# Patient Record
Sex: FEMALE | Race: WHITE | URBAN - METROPOLITAN AREA
[De-identification: names, ages, dates, MRNs, and addresses within clinical notes are randomized per-mention and may not be internally consistent; named-entity substitution may affect disease eponyms.]

---

## 2018-09-24 ENCOUNTER — OPTICAL OFFICE (OUTPATIENT)
Dept: URBAN - METROPOLITAN AREA CLINIC 143 | Facility: CLINIC | Age: 48
Setting detail: OPHTHALMOLOGY
End: 2018-09-24
Payer: COMMERCIAL

## 2018-09-24 ENCOUNTER — DOCTOR'S OFFICE (OUTPATIENT)
Dept: URBAN - METROPOLITAN AREA CLINIC 136 | Facility: CLINIC | Age: 48
Setting detail: OPHTHALMOLOGY
End: 2018-09-24
Payer: COMMERCIAL

## 2018-09-24 DIAGNOSIS — H52.4: ICD-10-CM

## 2018-09-24 DIAGNOSIS — H52.03: ICD-10-CM

## 2018-09-24 PROCEDURE — V2100 LENS SPHER SINGLE PLANO 4.00: HCPCS | Performed by: OPTOMETRIST

## 2018-09-24 PROCEDURE — V2784 LENS POLYCARB OR EQUAL: HCPCS | Performed by: OPTOMETRIST

## 2018-09-24 PROCEDURE — V2020 VISION SVCS FRAMES PURCHASES: HCPCS | Performed by: OPTOMETRIST

## 2018-09-24 PROCEDURE — 92014 COMPRE OPH EXAM EST PT 1/>: CPT | Performed by: OPTOMETRIST

## 2018-09-24 PROCEDURE — V2025 EYEGLASSES DELUX FRAMES: HCPCS | Performed by: OPTOMETRIST

## 2018-09-24 ASSESSMENT — REFRACTION_MANIFEST
OD_VA3: 20/
OS_SPHERE: +0.75
OS_CYLINDER: SPH
OS_VA2: 20/
OD_VA1: 20/
OU_VA: 20/20
OD_SPHERE: +1.50
OD_VA2: 20/
OD_ADD: +1.00
OS_VA1: 20/
OD_CYLINDER: SPH
OD_VA3: 20/
OD_VA1: 20/20
OS_CYLINDER: SPH
OD_CYLINDER: SPH
OS_ADD: +1.00
OD_VA2: 20/20
OS_VA1: 20/20
OS_VA2: 20/20
OD_SPHERE: +0.50
OS_SPHERE: +1.50
OS_VA3: 20/
OU_VA: 20/
OS_VA3: 20/

## 2018-09-24 ASSESSMENT — KERATOMETRY
OD_K1POWER_DIOPTERS: 42.75
OS_K1POWER_DIOPTERS: 43.50
OD_K2POWER_DIOPTERS: 43.75
OS_K2POWER_DIOPTERS: 43.75
OD_AXISANGLE_DEGREES: 166
OS_AXISANGLE_DEGREES: 020

## 2018-09-24 ASSESSMENT — REFRACTION_AUTOREFRACTION
OS_AXIS: 048
OS_CYLINDER: -0.25
OS_SPHERE: +1.50
OD_CYLINDER: 0.00
OD_SPHERE: +0.75

## 2018-09-24 ASSESSMENT — VISUAL ACUITY
OS_BCVA: 20/20
OD_BCVA: 20/20

## 2018-09-24 ASSESSMENT — SPHEQUIV_DERIVED
OD_SPHEQUIV: 0.75
OS_SPHEQUIV: 1.375

## 2018-09-24 ASSESSMENT — REFRACTION_CURRENTRX
OS_OVR_VA: 20/
OD_OVR_VA: 20/
OS_OVR_VA: 20/
OS_OVR_VA: 20/
OD_OVR_VA: 20/
OD_OVR_VA: 20/

## 2018-09-24 ASSESSMENT — CONFRONTATIONAL VISUAL FIELD TEST (CVF)
OD_FINDINGS: FULL
OS_FINDINGS: FULL

## 2018-09-24 ASSESSMENT — AXIALLENGTH_DERIVED
OS_AL: 23.0243
OD_AL: 23.3932

## 2018-10-23 ENCOUNTER — DOCTOR'S OFFICE (OUTPATIENT)
Dept: URBAN - METROPOLITAN AREA CLINIC 136 | Facility: CLINIC | Age: 48
Setting detail: OPHTHALMOLOGY
End: 2018-10-23
Payer: COMMERCIAL

## 2018-10-23 DIAGNOSIS — Z79.891: ICD-10-CM
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
## 2018-10-23 PROBLEM — H52.4 HYPEROPIA, PRESBYOPIA OU ; BOTH EYES: Status: ACTIVE | Noted: 2018-09-24

## 2018-10-23 PROBLEM — H52.03 HYPEROPIA, PRESBYOPIA OU ; BOTH EYES: Status: ACTIVE | Noted: 2018-09-24

## 2018-10-23 PROCEDURE — NO CHARGE N/C PROFESSIONAL COURTESY: Performed by: OPHTHALMOLOGY

## 2018-10-24 PROBLEM — H35.363 DRUSEN; BOTH EYES: Status: ACTIVE | Noted: 2018-10-23

## 2018-10-24 ASSESSMENT — REFRACTION_CURRENTRX
OS_OVR_VA: 20/
OD_OVR_VA: 20/
OS_OVR_VA: 20/
OD_OVR_VA: 20/
OS_OVR_VA: 20/
OD_OVR_VA: 20/

## 2018-10-24 ASSESSMENT — REFRACTION_MANIFEST
OS_CYLINDER: SPH
OD_CYLINDER: SPH
OU_VA: 20/
OS_VA2: 20/
OD_VA3: 20/
OS_CYLINDER: SPH
OS_VA3: 20/
OD_VA1: 20/20
OD_CYLINDER: SPH
OS_VA1: 20/
OD_VA1: 20/
OD_SPHERE: +0.50
OS_SPHERE: +0.75
OS_SPHERE: +1.50
OS_VA3: 20/
OS_ADD: +1.00
OD_VA2: 20/
OS_VA2: 20/20
OD_ADD: +1.00
OU_VA: 20/20
OD_SPHERE: +1.50
OS_VA1: 20/20
OD_VA2: 20/20
OD_VA3: 20/

## 2018-10-24 ASSESSMENT — SPHEQUIV_DERIVED
OD_SPHEQUIV: 0.75
OS_SPHEQUIV: 1.375

## 2018-10-24 ASSESSMENT — REFRACTION_AUTOREFRACTION
OD_CYLINDER: 0.00
OS_SPHERE: +1.50
OD_SPHERE: +0.75
OS_CYLINDER: -0.25
OS_AXIS: 048

## 2018-10-24 ASSESSMENT — VISUAL ACUITY
OS_BCVA: 20/20
OD_BCVA: 20/20

## 2022-12-21 ENCOUNTER — OFFICE VISIT (OUTPATIENT)
Dept: PODIATRY | Facility: CLINIC | Age: 52
End: 2022-12-21

## 2022-12-21 ENCOUNTER — APPOINTMENT (OUTPATIENT)
Dept: RADIOLOGY | Facility: CLINIC | Age: 52
End: 2022-12-21

## 2022-12-21 VITALS — SYSTOLIC BLOOD PRESSURE: 134 MMHG | WEIGHT: 178.2 LBS | DIASTOLIC BLOOD PRESSURE: 82 MMHG

## 2022-12-21 DIAGNOSIS — M20.11 HALLUX VALGUS OF RIGHT FOOT: ICD-10-CM

## 2022-12-21 DIAGNOSIS — M79.671 RIGHT FOOT PAIN: ICD-10-CM

## 2022-12-21 DIAGNOSIS — M20.11 HALLUX VALGUS OF RIGHT FOOT: Primary | ICD-10-CM

## 2022-12-21 RX ORDER — HYDROXYCHLOROQUINE SULFATE 200 MG/1
200 TABLET, FILM COATED ORAL DAILY
COMMUNITY

## 2022-12-21 RX ORDER — B-COMPLEX WITH VITAMIN C
1 TABLET ORAL
COMMUNITY

## 2022-12-21 RX ORDER — PREDNISONE 1 MG/1
5 TABLET ORAL EVERY OTHER DAY
COMMUNITY

## 2022-12-21 RX ORDER — PRAVASTATIN SODIUM 40 MG
40 TABLET ORAL DAILY
COMMUNITY
Start: 2022-12-10

## 2022-12-21 RX ORDER — LISINOPRIL 20 MG/1
20 TABLET ORAL DAILY
COMMUNITY
Start: 2022-07-06 | End: 2023-07-06

## 2022-12-21 RX ORDER — LEVOTHYROXINE SODIUM 0.05 MG/1
50 TABLET ORAL DAILY
COMMUNITY
Start: 2022-07-06

## 2022-12-21 NOTE — PROGRESS NOTES
Assessment/Plan:    Explained to patient that she is dealing with a hallux valgus deformity of the right foot  Clinically it appears to be a high moderate deformity  Discussed etiology and treatment options in detail  Discussed possibility of orthotics to keep bunion from getting worse  Explained that orthotics may be of help in this manner  Patient told to wear shoes that are soft and wide  such as the running shoes that she appears in today  Explained that surgical intervention is necessary for correction  Patient was referred for x-rays to assess current severity  She will be contacted with results  Note: 30 minutes spent with patient  No problem-specific Assessment & Plan notes found for this encounter  Diagnoses and all orders for this visit:    Hallux valgus of right foot    Right foot pain    Other orders  -     B COMPLEX VITAMINS PO; Take 1 tablet by mouth  -     calcium carbonate-vitamin D 500 mg-5 mcg per tablet; Take 1 tablet by mouth  -     Cholecalciferol 50 MCG (2000 UT) CAPS; Take 4,000 Units by mouth  -     levothyroxine 50 mcg tablet; Take 50 mcg by mouth daily  -     lisinopril (ZESTRIL) 20 mg tablet; Take 20 mg by mouth daily  -     pravastatin (PRAVACHOL) 40 mg tablet; Take 40 mg by mouth daily  -     hydroxychloroquine (Plaquenil) 200 mg tablet; Take 200 mg by mouth in the morning  -     predniSONE 5 mg tablet; Take 5 mg by mouth every other day          Subjective:      Patient ID: Gerald Zendejas is a 50 y o  female  HPI     Patient, a 61-year-old female in apparent good health presents with a painful bunion deformity of the right foot  Patient has had the bunion for years but in recent months it is gotten more uncomfortable  Patient also relates forefoot pain after extended walking  Recently, patient purchased a new white pair of running shoes and she feels much more comfortable wearing them  Patient's medical history is positive for lupus    She takes prednisone 5 mg every other day  The following portions of the patient's history were reviewed and updated as appropriate: allergies, current medications, past family history, past medical history, past social history, past surgical history and problem list     Review of Systems   Gastrointestinal: Negative  Musculoskeletal: Positive for arthralgias  Neurological: Negative for numbness  Psychiatric/Behavioral: Negative  Objective:      /82   Wt 80 8 kg (178 lb 3 2 oz)          Physical Exam  Constitutional:       Appearance: Normal appearance  Cardiovascular:      Pulses: Normal pulses  Musculoskeletal:         General: Deformity present  Comments: Hallux valgus deformity right foot  Right great toe is short compared to the left  Good range of motion first MPJ right foot  No excessive hyperpronation noted  No left foot bunion present  Skin:     General: Skin is warm  Neurological:      General: No focal deficit present  Mental Status: She is oriented to person, place, and time

## 2022-12-22 ENCOUNTER — TELEPHONE (OUTPATIENT)
Dept: PODIATRY | Facility: CLINIC | Age: 52
End: 2022-12-22

## 2022-12-22 NOTE — TELEPHONE ENCOUNTER
Called patient on December 22, 2022 to discuss x-rays taken of the right foot yesterday  Explained to patient that her intermetatarsal angle is approximately 14 to 15 degrees which makes this a high moderate deformity  Patient to decide whether she desires surgical intervention and will call to schedule

## 2023-03-21 ENCOUNTER — TELEPHONE (OUTPATIENT)
Dept: PODIATRY | Facility: CLINIC | Age: 53
End: 2023-03-21

## 2023-03-21 NOTE — TELEPHONE ENCOUNTER
Caller: [facility] ([facility])    Doctor/Office: Dr Geoff Franco    CB#: [phone] ext [phone]    Escalation: Appointment Efrem Cuellar is calling on behalf of patient Anastacia Schneider [phone]  She has an upcoming appt with them on 3-23-23  They got the release of health form along with other paperwork needed but are still missing imaging from our office  She said this is her 6th attempt and she is going to be faxing an overnight shipping label to fax [phone]  Please call back and advise with any questions  Thanks

## 2023-03-21 NOTE — TELEPHONE ENCOUNTER
Attempted to contact Kavita at number provided, but was placed on hold for >25 minutes with no answer or opportunity to speak to anyone  If call is returned, Dorothy Arms would need to obtain the images by requesting them through [facility] who can be reached at [phone]

## 2023-03-22 NOTE — TELEPHONE ENCOUNTER
LMOM to inform Crystal that they would need to request these records through [address] and included their phone number of [phone]  Our return phone number provided for any other questions or concerns

## 2023-03-31 NOTE — TELEPHONE ENCOUNTER
Caller: Patient- Sebas Hugo     Doctor: Dr Taniya Kilpatrick    Reason for call: Patient is calling in stating that it has been like 3 weeks and they still have not received her records/imaging she stated that some things were sent but not all of it  Provided patient with [employer] number to help her try to obtain info/records that are needed for her insurance       Call back#:

## 2024-07-02 ENCOUNTER — OFFICE VISIT (OUTPATIENT)
Dept: URGENT CARE | Facility: CLINIC | Age: 54
End: 2024-07-02
Payer: COMMERCIAL

## 2024-07-02 VITALS
DIASTOLIC BLOOD PRESSURE: 72 MMHG | WEIGHT: 173 LBS | TEMPERATURE: 98.9 F | HEART RATE: 78 BPM | RESPIRATION RATE: 18 BRPM | OXYGEN SATURATION: 98 % | SYSTOLIC BLOOD PRESSURE: 132 MMHG

## 2024-07-02 DIAGNOSIS — B34.9 ACUTE VIRAL SYNDROME: ICD-10-CM

## 2024-07-02 DIAGNOSIS — J06.9 UPPER RESPIRATORY TRACT INFECTION, UNSPECIFIED TYPE: Primary | ICD-10-CM

## 2024-07-02 PROCEDURE — G0382 LEV 3 HOSP TYPE B ED VISIT: HCPCS | Performed by: PHYSICIAN ASSISTANT

## 2024-07-02 RX ORDER — DEXTROMETHORPHAN HYDROBROMIDE AND PROMETHAZINE HYDROCHLORIDE 15; 6.25 MG/5ML; MG/5ML
SYRUP ORAL
Qty: 120 ML | Refills: 0 | Status: SHIPPED | OUTPATIENT
Start: 2024-07-02

## 2024-07-02 NOTE — PATIENT INSTRUCTIONS
This does appear to be viral illness.  There are multiple viral respiratory illnesses that can present like this.    Provider did review concern about COVID testing in our office and unsure if your insurance will cover.  Recommend simple over-the-counter COVID test.    It is positive, please feel free to call back to clinic today and provider will try to prescribe Paxlovid to your pharmacy.    Prescription cough cold medicine sent to pharmacy.  Please do not use any oral cough cold medications listed below.      There are a number of viral respiratory illnesses that can present similarly.  Most are self-limiting.  Antibiotics do not help viral illnesses.       Most upper respiratory symptoms start to improve after 7-12 days but may take a few weeks to completely resolve.        As with any respiratory illness, transmission precautions are strongly advised.  Masking.  Isolating.  Hand washing.  Frequent cleaning of common use surfaces.      Follow up with your PCP office if not starting to  improve over the next 7-10 days.  If you want to be proactive, you may contact your PCP office now to schedule follow up for near future.    If you feel like you are severely worsening or have significant weakness, chest pain, shortness of breath proceed to ER for immediate further evaluation.    ---------------------------------------------------------------------------------------------------------------------------------------------------------------------------------------------------------------------------------------------------------------    Strongly encourage getting plenty of rest over the next few days.  Increase your hydration.    Vaporizer by bedside.      Symptom Relief Suggestions:    Options for sore throat or hoarseness:              * Warm salt water gargles every 1-2 hours while awake        * Throat lozenges, Tylenol, voice rest, warm tea with honey.    Options for sinus pressure, nasal congestion, runny nose,  and / or post nasal drip:            * Clearing your sinuses in a nice steamy shower may be helpful, especially first thing after waking.       * Nasal saline rinses every 1-2 hours while awake may also help decrease nasal congestion, drainage.       * Afrin nasal spray if significant nasal congestion at bedtime may use .  (Do not use for over 3 days however.)       * Due to your history of high blood pressure and / or heart disease, we recommend that you avoid oral decongestants (decongestants can elevate blood pressure).       * Coricidin HPB  may be helpful for your sinus symptoms.        * Flonase nasal spray may be helpful.      Options for ear pressure, discomfort:         * Coricidin HPB.       * Flonase nasal spray.       * Warm compresses against ear(s) for comfort.    Options for help with  cough:         * Warm tea with honey or a teaspoon of honey periodically throughout day and / or before bed.       * Plain Mucinex (an expectorant to help keep mucus thin so you can clear it easier).       * Night time cough medications:  Mucinex DM (expectorant / cough suppressant) or other night time cough medication options include Delsym, Robitussin DM, NyQuil.         * Propping with an extra pillow or two may be helpful.       * Keep water by your bedside to sip on as needed.       * Cough drops.    -----------------------------------------------------------------------------------------------------------------------------------------------------------------------------------------------------------------------------------------------------------    Although bothersome, mucous is not necessarily a bad thing.  Production of mucous is the body's way of trying to capture and flush irritants from mucosal surfaces.  Yellow or green mucous does not necessarily mean you have a bacterial infection.   Mucous will become more discolored over time, especially first thing in the morning, as your body's immune system also  floods the mucosal surfaces with white bloods cells to try and help fight infection.  This white blood cell debride can also cause mucous to be discolored.  Again, using nasal saline spray frequently may help soothe and keep mucous flowing out versus getting dried, thickened and / or stuck leading to more sinus pain and pressure.       If you have a cough, please realize that a cough is not necessarily a bad thing but a way that your body tries to keep airways clear.  Phlegm may be more discolored in the morning.  Please note that discolored phlegm does not necessarily mean a bacterial infection.

## 2024-07-02 NOTE — PROGRESS NOTES
[facility]    NAME: Lisa Newman is a 53 y.o. female  : 1970    MRN: [id number]  DATE: 2024  TIME: 11:12 AM    Assessment and Plan   Upper respiratory tract infection, unspecified type [J06.9]  1. Upper respiratory tract infection, unspecified type  promethazine-dextromethorphan (PHENERGAN-DM) 6.25-15 mg/5 mL oral syrup      2. Acute viral syndrome          Patient was informed that some insurances do not cover office COVID testing.  She decided to do home COVID testing.  If it is positive she states they will call back for possible Paxlovid prescription.    Last eGFR reviewed.  2023 69.        Patient Instructions     Patient Instructions   This does appear to be viral illness.  There are multiple viral respiratory illnesses that can present like this.    Provider did review concern about COVID testing in our office and unsure if your insurance will cover.  Recommend simple over-the-counter COVID test.    It is positive, please feel free to call back to clinic today and provider will try to prescribe Paxlovid to your pharmacy.    Prescription cough cold medicine sent to pharmacy.  Please do not use any oral cough cold medications listed below.      There are a number of viral respiratory illnesses that can present similarly.  Most are self-limiting.  Antibiotics do not help viral illnesses.       Most upper respiratory symptoms start to improve after 7-12 days but may take a few weeks to completely resolve.        As with any respiratory illness, transmission precautions are strongly advised.  Masking.  Isolating.  Hand washing.  Frequent cleaning of common use surfaces.      Follow up with your PCP office if not starting to  improve over the next 7-10 days.  If you want to be proactive, you may contact your PCP office now to schedule follow up for near future.    If you feel like you are severely worsening or have significant weakness, chest pain, shortness of breath proceed to ER  for immediate further evaluation.    ---------------------------------------------------------------------------------------------------------------------------------------------------------------------------------------------------------------------------------------------------------------    Strongly encourage getting plenty of rest over the next few days.  Increase your hydration.    Vaporizer by bedside.      Symptom Relief Suggestions:    Options for sore throat or hoarseness:              * Warm salt water gargles every 1-2 hours while awake        * Throat lozenges, Tylenol, voice rest, warm tea with honey.    Options for sinus pressure, nasal congestion, runny nose, and / or post nasal drip:            * Clearing your sinuses in a nice steamy shower may be helpful, especially first thing after waking.       * Nasal saline rinses every 1-2 hours while awake may also help decrease nasal congestion, drainage.       * Afrin nasal spray if significant nasal congestion at bedtime may use .  (Do not use for over 3 days however.)       * Due to your history of high blood pressure and / or heart disease, we recommend that you avoid oral decongestants (decongestants can elevate blood pressure).       * Coricidin HPB  may be helpful for your sinus symptoms.        * Flonase nasal spray may be helpful.      Options for ear pressure, discomfort:         * Coricidin HPB.       * Flonase nasal spray.       * Warm compresses against ear(s) for comfort.    Options for help with  cough:         * Warm tea with honey or a teaspoon of honey periodically throughout day and / or before bed.       * Plain Mucinex (an expectorant to help keep mucus thin so you can clear it easier).       * Night time cough medications:  Mucinex DM (expectorant / cough suppressant) or other night time cough medication options include Delsym, Robitussin DM, NyQuil.         * Propping with an extra pillow or two may be helpful.       * Keep water by  your bedside to sip on as needed.       * Cough drops.    -----------------------------------------------------------------------------------------------------------------------------------------------------------------------------------------------------------------------------------------------------------    Although bothersome, mucous is not necessarily a bad thing.  Production of mucous is the body's way of trying to capture and flush irritants from mucosal surfaces.  Yellow or green mucous does not necessarily mean you have a bacterial infection.   Mucous will become more discolored over time, especially first thing in the morning, as your body's immune system also floods the mucosal surfaces with white bloods cells to try and help fight infection.  This white blood cell debride can also cause mucous to be discolored.  Again, using nasal saline spray frequently may help soothe and keep mucous flowing out versus getting dried, thickened and / or stuck leading to more sinus pain and pressure.       If you have a cough, please realize that a cough is not necessarily a bad thing but a way that your body tries to keep airways clear.  Phlegm may be more discolored in the morning.  Please note that discolored phlegm does not necessarily mean a bacterial infection.                     Chief Complaint     Chief Complaint   Patient presents with    Cold Like Symptoms     Patient with c/o cough, HA, runny nose, sore throat since Saturday . They were on a cruise. Patient also c/o shoulder soreness       History of Present Illness   Lisa Newman presents to the clinic c/o  53-year-old female comes in with sore throat runny nose headache body aches and pains and feeling feverish.    Started: Felt a little off on Saturday but yesterday symptoms really worsened with sore throat.  Associated signs and symptoms: Fatigue, muscles in shoulders and neck sore, headache, constant runny nose, sore throat.  Modifying factors: NyQuil  cold and flu.  Known Exposures: Unknown.  Just returned on Saturday from cruise to the [facility].  Recent travel:  No.  Hx asthma:  No.  Hx pneumonia:  No.  Smoker: Yes.    Has not done any COVID testing.  Is on blood pressure medicine for kidney disease.  Also takes Plaquenil for lupus.        Review of Systems   Review of Systems   Constitutional:  Positive for activity change, appetite change, fatigue and fever. Negative for chills and diaphoresis.        Subjective fever   HENT:  Positive for congestion, postnasal drip, rhinorrhea and sore throat. Negative for ear discharge, ear pain, sinus pressure and sinus pain.    Eyes: Negative.    Respiratory:  Positive for cough. Negative for chest tightness, shortness of breath and wheezing.    Cardiovascular:  Negative for chest pain.   Musculoskeletal:  Positive for myalgias.   Skin:  Negative for color change.   Neurological:  Positive for headaches.       Current Medications     Long-Term Medications   Medication Sig Dispense Refill    hydroxychloroquine (Plaquenil) 200 mg tablet Take 200 mg by mouth in the morning      levothyroxine 50 mcg tablet Take 50 mcg by mouth daily      lisinopril (ZESTRIL) 20 mg tablet Take 20 mg by mouth daily      pravastatin (PRAVACHOL) 40 mg tablet Take 40 mg by mouth daily      Cholecalciferol 50 MCG (2000 UT) CAPS Take 4,000 Units by mouth (Patient not taking: Reported on 7/2/2024)         Current Allergies     Allergies as of 07/02/2024 - Reviewed 07/02/2024   Allergen Reaction Noted    Penicillins Other (See Comments) 05/31/2013          The following portions of the patient's history were reviewed and updated as appropriate: allergies, current medications, past family history, past medical history, past social history, past surgical history and problem list.  Past Medical History:   Diagnosis Date    Disease of thyroid gland     Hyperlipidemia     Hypertension      History reviewed. No pertinent surgical history.  History reviewed.  No pertinent family history.    Objective   /72   Pulse 78   Temp 98.9 °F (37.2 °C) (Tympanic)   Resp 18   Wt 78.5 kg (173 lb)   SpO2 98%   No LMP recorded. Patient is perimenopausal.       Physical Exam     Physical Exam  Vitals and nursing note reviewed.   Constitutional:       General: She is not in acute distress.     Appearance: She is well-developed. She is ill-appearing. She is not toxic-appearing or diaphoretic.      Comments: Appears mildly ill but in no acute distress.  No trismus or conversational dyspnea.  Accompanied by spouse.   HENT:      Head: Normocephalic and atraumatic.      Right Ear: Tympanic membrane, ear canal and external ear normal.      Left Ear: Tympanic membrane, ear canal and external ear normal.      Nose: Congestion and rhinorrhea present.      Mouth/Throat:      Mouth: Mucous membranes are moist.      Pharynx: Posterior oropharyngeal erythema present. No oropharyngeal exudate.      Comments: Cobblestoning posterior pharynx with patchy redness  Eyes:      General:         Right eye: No discharge.         Left eye: No discharge.      Conjunctiva/sclera: Conjunctivae normal.      Pupils: Pupils are equal, round, and reactive to light.   Cardiovascular:      Rate and Rhythm: Normal rate and regular rhythm.      Heart sounds: Normal heart sounds. No murmur heard.     No friction rub. No gallop.   Pulmonary:      Effort: Pulmonary effort is normal. No respiratory distress.      Breath sounds: Normal breath sounds. No stridor. No wheezing, rhonchi or rales.   Musculoskeletal:      Cervical back: Normal range of motion and neck supple. No rigidity or tenderness.   Lymphadenopathy:      Cervical: No cervical adenopathy.   Skin:     General: Skin is warm and dry.      Coloration: Skin is not jaundiced or pale.      Findings: No rash.   Neurological:      Mental Status: She is alert and oriented to person, place, and time.   Psychiatric:         Mood and Affect: Mood normal.          Behavior: Behavior normal.